# Patient Record
Sex: FEMALE | Race: WHITE | HISPANIC OR LATINO | Employment: FULL TIME
[De-identification: names, ages, dates, MRNs, and addresses within clinical notes are randomized per-mention and may not be internally consistent; named-entity substitution may affect disease eponyms.]

---

## 2017-12-24 ENCOUNTER — HEALTH MAINTENANCE LETTER (OUTPATIENT)
Age: 27
End: 2017-12-24

## 2018-11-24 ENCOUNTER — APPOINTMENT (OUTPATIENT)
Dept: GENERAL RADIOLOGY | Facility: CLINIC | Age: 28
End: 2018-11-24
Attending: NURSE PRACTITIONER
Payer: OTHER MISCELLANEOUS

## 2018-11-24 ENCOUNTER — HOSPITAL ENCOUNTER (EMERGENCY)
Facility: CLINIC | Age: 28
Discharge: HOME | End: 2018-11-24
Attending: NURSE PRACTITIONER | Admitting: NURSE PRACTITIONER
Payer: OTHER MISCELLANEOUS

## 2018-11-24 VITALS
HEIGHT: 60 IN | DIASTOLIC BLOOD PRESSURE: 72 MMHG | BODY MASS INDEX: 24.74 KG/M2 | WEIGHT: 126 LBS | HEART RATE: 71 BPM | SYSTOLIC BLOOD PRESSURE: 118 MMHG | TEMPERATURE: 97.5 F | RESPIRATION RATE: 18 BRPM | OXYGEN SATURATION: 98 %

## 2018-11-24 DIAGNOSIS — M25.532 LEFT WRIST PAIN: ICD-10-CM
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
## 2018-11-24 DIAGNOSIS — S62.002A CLOSED NONDISPLACED FRACTURE OF SCAPHOID OF LEFT WRIST, UNSPECIFIED PORTION OF SCAPHOID, INITIAL ENCOUNTER: ICD-10-CM

## 2018-11-24 PROCEDURE — 99284 EMERGENCY DEPT VISIT MOD MDM: CPT | Mod: 25

## 2018-11-24 PROCEDURE — 25000132 ZZH RX MED GY IP 250 OP 250 PS 637: Performed by: NURSE PRACTITIONER

## 2018-11-24 PROCEDURE — 25622 CLTX CARPL SCPHD FX W/O MNPJ: CPT | Mod: LT

## 2018-11-24 PROCEDURE — 73110 X-RAY EXAM OF WRIST: CPT | Mod: LT

## 2018-11-24 PROCEDURE — 73130 X-RAY EXAM OF HAND: CPT | Mod: LT

## 2018-11-24 RX ORDER — HYDROCODONE BITARTRATE AND ACETAMINOPHEN 5; 325 MG/1; MG/1
1 TABLET ORAL EVERY 6 HOURS PRN
Qty: 10 TABLET | Refills: 0 | Status: SHIPPED | OUTPATIENT
Start: 2018-11-24

## 2018-11-24 RX ORDER — HYDROCODONE BITARTRATE AND ACETAMINOPHEN 5; 325 MG/1; MG/1
2 TABLET ORAL ONCE
Status: COMPLETED | OUTPATIENT
Start: 2018-11-24 | End: 2018-11-24

## 2018-11-24 RX ORDER — HYDROCODONE BITARTRATE AND ACETAMINOPHEN 5; 325 MG/1; MG/1
1 TABLET ORAL EVERY 6 HOURS PRN
Qty: 10 TABLET | Refills: 0 | Status: SHIPPED | OUTPATIENT
Start: 2018-11-24 | End: 2018-11-24

## 2018-11-24 RX ADMIN — HYDROCODONE BITARTRATE AND ACETAMINOPHEN 2 TABLET: 5; 325 TABLET ORAL at 19:55

## 2018-11-24 ASSESSMENT — ENCOUNTER SYMPTOMS
JOINT SWELLING: 1
HEADACHES: 0
MYALGIAS: 1
ARTHRALGIAS: 1

## 2018-11-24 NOTE — ED PROVIDER NOTES
History     Chief Complaint:  Wrist Pain     ERASTO Barros is a 28 year old female who presents with left wrist pain in the setting of a mechanical fall. The patient works as a , and earlier today states that she was reaching to grab a blanket while standing on a ladder, when she fell backwards off the ladder. Upon falling the patient extended her left arm out in an attempt to break her fall and prevent injury to her head. She fell from approximately 3 steps up. She did not hit her head or sustain a loss of consciousness and she denies nausea or vomiting. Since the incident, however, she continues to have pain and swelling in her left wrist. This does not extend into her elbow or shoulder. Additionally the patient endorses lower back pain, but notes that her concern is primarily for her left wrist. She did not otherwise sustain any other injury.      Allergies:  No known drug allergies    Medications:    The patient is not currently taking any prescribed medications.    Past Medical History:    The patient does not have any past pertinent medical history.    Past Surgical History:    History reviewed. No pertinent surgical history.    Family History:    History reviewed. No pertinent family history.     Social History:  The patient was accompanied to the ED.  Smoking Status: Never  Smokeless Tobacco: Never  Alcohol Use: No     Review of Systems   Musculoskeletal: Positive for arthralgias, joint swelling and myalgias.   Neurological: Negative for syncope and headaches.   All other systems reviewed and are negative.    Physical Exam     Patient Vitals for the past 24 hrs:   BP Temp Temp src Pulse Resp SpO2 Height Weight   11/24/18 1731 123/83 97.5  F (36.4  C) Oral 71 18 98 % 1.524 m (5') 57.2 kg (126 lb)     Physical Exam  Physical Exam   Constitutional: Pt appears well-developed and well-nourished.  Head: Atraumatic. Head moves freely with normal range of motion. No battles signs. No Racoons  eyes.   ENT: Oropharynx is clear and moist. Nose with no deformity.   Eyes: Conjunctivae pink. EOMs intact. Pupils are equal, round, and reactive to light.  Neck: Normal range of motion. No midline C Spine tenderness, step-off or crepitus.   Cardiovascular: Regular rate and rhythm. Normal heart sounds. Intact distal pulses: radial pulses 2+ on the right, 2+ on the left.   Pulmonary/Chest: No respiratory distress.  Breath sounds normal.   Abdominal: Soft. Non-tender. No rebound, no guarding.   Musculoskeletal: Left wrist with mild edema, no erythema or heat to touch. Increased pain with flexion and extension of the wrist. Pain over the anatomic snuff box and increased pain with axial loading of the thumb. Thumb and 1st MC are non tender to palpation. No increased pain with stress of the ulnar collateral ligament of the thumb. Normal coloration and sensation of the wrist and hand. Distal capillary refill intact. Right low back with diffuse muscle tenderness. No lumbar bony spinal pain.   Neurological: Oriented to person, place, and time. No focal deficits.   Skin: Skin is warm.         Emergency Department Course     Imaging:  Radiology findings were communicated with the patient who voiced understanding of the findings.  XR Wrist Left G/E 3 Views:  IMPRESSION: No acute osseous abnormality demonstrated.    Per radiology    Interventions:  1955 - Norco 325 mg per tablet, 2 tablets PO     Emergency Department Course:  Nursing notes and vitals reviewed.    The patient was sent for x-ray imaging while in the emergency department, results above.     1857: I performed an exam of the patient as documented above.     Findings and plan explained to the Patient. Patient discharged home with instructions regarding supportive care, medications, and reasons to return. The importance of close follow-up was reviewed.     Impression & Plan      Medical Decision Making:  Argentina Barros is a 28 year old female presents for  evaluation of left wrist pain. Exam is concerning for scaphoid fracture and this fits with the mechanism of injury. Xray is negative and we discussed that this is an occult fracture and we often do not see this on initial imaging. No pain to the hand or forearm on exam. Thumb spica splint placed and follow up with Orthopedics was advised in 5 days. We discussed reasons to return here. Remainder of exam with no concerns for further injuries. She and her family are amenable to plan.       Diagnosis:    ICD-10-CM    1. Left wrist pain M25.532    2. Fall from ladder, initial encounter W11.XXXA    3. Closed nondisplaced fracture of scaphoid of left wrist, unspecified portion of scaphoid, initial encounter S62.002A        Disposition:  discharged to home    Discharge Medications:  New Prescriptions    HYDROCODONE-ACETAMINOPHEN (NORCO) 5-325 MG TABLET    Take 1 tablet by mouth every 6 hours as needed for severe pain         Marie Acosta  11/24/2018    EMERGENCY DEPARTMENT  I, Marie Acosta am serving as a scribe at 6:57 PM on 11/24/2018 to document services personally performed by Ayala Cortez based on my observations and the provider's statements to me.       Ayala Cortez, APRN CNP  11/24/18 2403

## 2018-11-24 NOTE — LETTER
November 24, 2018      To Whom It May Concern:      Salud Barros was seen in our Emergency Department today, 11/24/18.    She will not bel able to use her left arm/hand until seen by Orthopedics at the end of this week. They will provide further guidance for restriction at that time.         Sincerely,        JINNY Cardoso CNP

## 2018-11-24 NOTE — ED AVS SNAPSHOT
Emergency Department    [address] [facility] [address]    Phone:  [phone]    Fax:  [phone]                                       Salud Barros   MRN: [id number]    Department:   Emergency Department   Date of Visit:  11/24/2018           After Visit Summary Signature Page     I have received my discharge instructions, and my questions have been answered. I have discussed any challenges I see with this plan with the nurse or doctor.    ..........................................................................................................................................  Patient/Patient Representative Signature      ..........................................................................................................................................  Patient Representative Print Name and Relationship to Patient    ..................................................               ................................................  Date                                   Time    ..........................................................................................................................................  Reviewed by Signature/Title    ...................................................              ..............................................  Date                                               Time          22EPIC Rev 08/18

## 2018-11-24 NOTE — LETTER
November 24, 2018      To Whom It May Concern:      Sanju Castro was seen in our Emergency Department today, 11/24/18.      She will not be able to use her left hand/arm until she is seen by Orthopedics.           Sincerely,        JINNY Cardoso CNP

## 2018-11-24 NOTE — ED AVS SNAPSHOT
Emergency Department    [address] [facility] [address]    Phone:  [phone]    Fax:  [phone]                                       Salud Barros   MRN: [id number]    Department:   Emergency Department   Date of Visit:  11/24/2018           Patient Information     Date Of Birth          1990        Your diagnoses for this visit were:     Left wrist pain     Fall from ladder, initial encounter     Closed nondisplaced fracture of scaphoid of left wrist, unspecified portion of scaphoid, initial encounter        You were seen by Ayala Cortez APRN CNP.      Follow-up Information     Follow up with [facility] In 5 days.    Contact information:    [address] [address] [phone]        Discharge Instructions         Possible Wrist Fracture  You are very sore over a bone in your wrist called the navicular, or scaphoid, bone. This could be a sign of a hairline fracture, or break. But no fracture was seen on the X-ray. So a splint or cast will be applied until repeat X-rays are taken in about 1 to 2 weeks or other tests are done. If you have a hairline fracture, it will often show up on the second X-ray. Then you will have to keep wearing a cast for about 6 to 20 weeks, depending on the location of the fracture. Sometimes other tests such as an MRI are needed. If no fracture is seen on the second X-ray or other tests, this means you may only have a wrist sprain. The splint or cast can then often be removed.     Home care    Keep your arm raised to reduce pain and swelling. When sitting or lying down, raise your arm above the level of your heart. You can do this by placing your arm on a pillow that rests on your chest or on a pillow at your side. This is most important during the first 48 hours after injury.    Place an ice pack over the injured area for no more than 15 to 20 minutes. Do this every 1 to 2 hours for the  first 24 to 48 hours. To make an ice pack, put ice cubes in a plastic bag that seals at the top. Wrap the bag in a clean, thin towel or cloth. Never put ice or an ice pack directly on the skin. As the ice melts, be careful that the cast or splint doesn t get wet. You can place the ice pack inside the sling and directly over the splint or cast. Keep using ice packs as needed to ease pain and swelling.    Keep the cast or splint completely dry at all times. Bathe with your cast or splint out of the water. Protect it with 2 large plastic bags. Place 1 bag around the other. Tape each bag with duct tape at the top end or use rubber bands. If a fiberglass cast or splint gets wet, you can dry it with a hair dryer on a cool setting.    You may use over-the-counter pain medicine to control pain, unless another pain medicine was prescribed. If you have chronic liver or kidney disease or ever had a stomach ulcer or GI (gastrointestinal) bleeding, talk with your provider before using these medicines.    If you smoke, try to quit. Tobacco use can interfere with the healing of this fracture. It can also increase the risk of a complication needing surgery.  Follow-up care  Follow up with your healthcare provider in 1 week, or as advised. This is to be sure the bone is healing properly.  If X-rays were taken, you will be told of any new findings that may affect your care.  When to seek medical advice  Call your healthcare provider right away if any of the following occur:    The plaster cast or splint becomes wet or soft    The plaster cast or splint becomes loose    The fiberglass cast or splint remains wet for more than 24 hours    Increased tightness or pain occurs under the cast or splint    Fingers become swollen, cold, blue, numb, or tingly  Date Last Reviewed: 5/1/2018 2000-2018 [employer]. [address] [place] [address], [place] [address]. All rights reserved. This information is not intended as a substitute for  professional medical care. Always follow your healthcare professional's instructions.          24 Hour Appointment Hotline       To make an appointment at any [facility], call [phone] ([phone]). If you don't have a family doctor or clinic, we will help you find one. [facility] are conveniently located to serve the needs of you and your family.             Review of your medicines      START taking        Dose / Directions Last dose taken    HYDROcodone-acetaminophen 5-325 MG tablet   Commonly known as:  NORCO   Dose:  1 tablet   Quantity:  10 tablet        Take 1 tablet by mouth every 6 hours as needed for severe pain   Refills:  0                Information about OPIOIDS     PRESCRIPTION OPIOIDS: WHAT YOU NEED TO KNOW   We gave you an opioid (narcotic) pain medicine. It is important to manage your pain, but opioids are not always the best choice. You should first try all the other options your care team gave you. Take this medicine for as short a time (and as few doses) as possible.    Some activities can increase your pain, such as bandage changes or therapy sessions. It may help to take your pain medicine 30 to 60 minutes before these activities. Reduce your stress by getting enough sleep, working on hobbies you enjoy and practicing relaxation or meditation. Talk to your care team about ways to manage your pain beyond prescription opioids.    These medicines have risks:    DO NOT drive when on new or higher doses of pain medicine. These medicines can affect your alertness and reaction times, and you could be arrested for driving under the influence (DUI). If you need to use opioids long-term, talk to your care team about driving.    DO NOT operate heavy machinery    DO NOT do any other dangerous activities while taking these medicines.    DO NOT drink any alcohol while taking these medicines.     If the opioid prescribed includes acetaminophen, DO NOT take with any other medicines that  contain acetaminophen. Read all labels carefully. Look for the word  acetaminophen  or  Tylenol.  Ask your pharmacist if you have questions or are unsure.    You can get addicted to pain medicines, especially if you have a history of addiction (chemical, alcohol or substance dependence). Talk to your care team about ways to reduce this risk.    All opioids tend to cause constipation. Drink plenty of water and eat foods that have a lot of fiber, such as fruits, vegetables, prune juice, apple juice and high-fiber cereal. Take a laxative (Miralax, milk of magnesia, Colace, Senna) if you don t move your bowels at least every other day. Other side effects include upset stomach, sleepiness, dizziness, throwing up, tolerance (needing more of the medicine to have the same effect), physical dependence and slowed breathing.    Store your pills in a secure place, locked if possible. We will not replace any lost or stolen medicine. If you don t finish your medicine, please throw away (dispose) as directed by your pharmacist. The [place] [employer] has more information about safe disposal: https://www.pca.state.[place]/living-green/managing-unwanted-medications        Prescriptions were sent or printed at these locations (1 Prescription)                   Other Prescriptions                Printed at Department/Unit printer (1 of 1)         HYDROcodone-acetaminophen (NORCO) 5-325 MG tablet                Procedures and tests performed during your visit     XR Hand Left G/E 3 Views    XR Wrist Left G/E 3 Views      Orders Needing Specimen Collection     None      Pending Results     Date and Time Order Name Status Description    11/24/2018 1814 XR Wrist Left G/E 3 Views Preliminary     11/24/2018 1814 XR Hand Left G/E 3 Views Preliminary             Pending Culture Results     No orders found from 11/22/2018 to 11/25/2018.            Pending Results Instructions     If you had any lab results that were not finalized  at the time of your Discharge, you can call the ED Lab Result RN at [phone]. You will be contacted by this team for any positive Lab results or changes in treatment. The nurses are available 7 days a week from 10A to 6:30P.  You can leave a message 24 hours per day and they will return your call.        Test Results From Your Hospital Stay        11/24/2018  6:39 PM      Narrative     HAND THREE VIEWS LEFT  11/24/2018 6:25 PM     HISTORY: Fall.     COMPARISON: None.    FINDINGS: There is no significant degenerative change. There is no  acute fracture or dislocation. There are no worrisome bony lesions.        Impression     IMPRESSION: No acute osseous abnormality demonstrated.         11/24/2018  6:39 PM      Narrative     WRIST THREE VIEWS LEFT 11/24/2018 6:26 PM     HISTORY: Fall.     COMPARISON: None.    FINDINGS: There is no significant degenerative change. The  scapholunate interval appears within normal limits. There is no acute  fracture.  No dislocation.There are no worrisome bony lesions.        Impression     IMPRESSION: No acute osseous abnormality demonstrated.                Clinical Quality Measure: Blood Pressure Screening     Your blood pressure was checked while you were in the emergency department today. The last reading we obtained was  BP: 123/83 . Please read the guidelines below about what these numbers mean and what you should do about them.  If your systolic blood pressure (the top number) is less than 120 and your diastolic blood pressure (the bottom number) is less than 80, then your blood pressure is normal. There is nothing more that you need to do about it.  If your systolic blood pressure (the top number) is 120-139 or your diastolic blood pressure (the bottom number) is 80-89, your blood pressure may be higher than it should be. You should have your blood pressure rechecked within a year by a primary care provider.  If your systolic blood pressure (the top number) is 140 or  "greater or your diastolic blood pressure (the bottom number) is 90 or greater, you may have high blood pressure. High blood pressure is treatable, but if left untreated over time it can put you at risk for heart attack, stroke, or kidney failure. You should have your blood pressure rechecked by a primary care provider within the next 4 weeks.  If your provider in the emergency department today gave you specific instructions to follow-up with your doctor or provider even sooner than that, you should follow that instruction and not wait for up to 4 weeks for your follow-up visit.        Thank you for choosing [facility]       Thank you for choosing [facility] for your care. Our goal is always to provide you with excellent care. Hearing back from our patients is one way we can continue to improve our services. Please take a few minutes to complete the written survey that you may receive in the mail after you visit with us. Thank you!        [employer] Information     [employer] lets you send messages to your doctor, view your test results, renew your prescriptions, schedule appointments and more. To sign up, go to [email or web address] . Click on \"Log in\" on the left side of the screen, which will take you to the Welcome page. Then click on \"Sign up Now\" on the right side of the page.     You will be asked to enter the access code listed below, as well as some personal information. Please follow the directions to create your username and password.     Your access code is: [email or web address]  Expires: 2019  7:38 PM     Your access code will  in 90 days. If you need help or a new code, please call your [facility] clinic or [phone].        Care EveryWhere ID     This is your Care EveryWhere ID. This could be used by other organizations to access your [facility] medical records  [id number]        Equal Access to Services     JEREMY MACIEL AH: avinash Staton qaybta kaalmada adeegyada, waxay " tashi allred ah. So [facility] [phone].    ATENCIÓN: Si habla español, tiene a tsang disposición servicios gratuitos de asistencia lingüística. Llame al [phone].    We comply with applicable federal civil rights laws and [place] laws. We do not discriminate on the basis of race, color, national origin, age, disability, sex, sexual orientation, or gender identity.            After Visit Summary       This is your record. Keep this with you and show to your community pharmacist(s) and doctor(s) at your next visit.

## 2018-11-25 NOTE — DISCHARGE INSTRUCTIONS
Possible Wrist Fracture  You are very sore over a bone in your wrist called the navicular, or scaphoid, bone. This could be a sign of a hairline fracture, or break. But no fracture was seen on the X-ray. So a splint or cast will be applied until repeat X-rays are taken in about 1 to 2 weeks or other tests are done. If you have a hairline fracture, it will often show up on the second X-ray. Then you will have to keep wearing a cast for about 6 to 20 weeks, depending on the location of the fracture. Sometimes other tests such as an MRI are needed. If no fracture is seen on the second X-ray or other tests, this means you may only have a wrist sprain. The splint or cast can then often be removed.     Home care    Keep your arm raised to reduce pain and swelling. When sitting or lying down, raise your arm above the level of your heart. You can do this by placing your arm on a pillow that rests on your chest or on a pillow at your side. This is most important during the first 48 hours after injury.    Place an ice pack over the injured area for no more than 15 to 20 minutes. Do this every 1 to 2 hours for the first 24 to 48 hours. To make an ice pack, put ice cubes in a plastic bag that seals at the top. Wrap the bag in a clean, thin towel or cloth. Never put ice or an ice pack directly on the skin. As the ice melts, be careful that the cast or splint doesn t get wet. You can place the ice pack inside the sling and directly over the splint or cast. Keep using ice packs as needed to ease pain and swelling.    Keep the cast or splint completely dry at all times. Bathe with your cast or splint out of the water. Protect it with 2 large plastic bags. Place 1 bag around the other. Tape each bag with duct tape at the top end or use rubber bands. If a fiberglass cast or splint gets wet, you can dry it with a hair dryer on a cool setting.    You may use over-the-counter pain medicine to control pain, unless another pain medicine  was prescribed. If you have chronic liver or kidney disease or ever had a stomach ulcer or GI (gastrointestinal) bleeding, talk with your provider before using these medicines.    If you smoke, try to quit. Tobacco use can interfere with the healing of this fracture. It can also increase the risk of a complication needing surgery.  Follow-up care  Follow up with your healthcare provider in 1 week, or as advised. This is to be sure the bone is healing properly.  If X-rays were taken, you will be told of any new findings that may affect your care.  When to seek medical advice  Call your healthcare provider right away if any of the following occur:    The plaster cast or splint becomes wet or soft    The plaster cast or splint becomes loose    The fiberglass cast or splint remains wet for more than 24 hours    Increased tightness or pain occurs under the cast or splint    Fingers become swollen, cold, blue, numb, or tingly  Date Last Reviewed: 5/1/2018 2000-2018 [employer]. [address] [place] [address], [place] [id number]. All rights reserved. This information is not intended as a substitute for professional medical care. Always follow your healthcare professional's instructions.

## 2019-04-15 ENCOUNTER — APPOINTMENT (OUTPATIENT)
Dept: CT IMAGING | Facility: CLINIC | Age: 29
End: 2019-04-15
Attending: EMERGENCY MEDICINE
Payer: MEDICAID

## 2019-04-15 ENCOUNTER — HOSPITAL ENCOUNTER (EMERGENCY)
Facility: CLINIC | Age: 29
Discharge: HOME | End: 2019-04-15
Attending: EMERGENCY MEDICINE | Admitting: EMERGENCY MEDICINE
Payer: MEDICAID

## 2019-04-15 VITALS
HEIGHT: 62 IN | RESPIRATION RATE: 16 BRPM | TEMPERATURE: 98.3 F | BODY MASS INDEX: 24.48 KG/M2 | SYSTOLIC BLOOD PRESSURE: 118 MMHG | HEART RATE: 81 BPM | DIASTOLIC BLOOD PRESSURE: 78 MMHG | OXYGEN SATURATION: 97 % | WEIGHT: 133 LBS

## 2019-04-15 DIAGNOSIS — R10.10 PAIN OF UPPER ABDOMEN: ICD-10-CM

## 2019-04-15 LAB
ALBUMIN SERPL-MCNC: 3.8 G/DL (ref 3.4–5)
ALBUMIN UR-MCNC: NEGATIVE MG/DL
ALP SERPL-CCNC: 92 U/L (ref 40–150)
ALT SERPL W P-5'-P-CCNC: 23 U/L (ref 0–50)
ANION GAP SERPL CALCULATED.3IONS-SCNC: 8 MMOL/L (ref 3–14)
APPEARANCE UR: CLEAR
AST SERPL W P-5'-P-CCNC: 18 U/L (ref 0–45)
BACTERIA #/AREA URNS HPF: ABNORMAL /HPF
BASOPHILS # BLD AUTO: 0 10E9/L (ref 0–0.2)
BASOPHILS NFR BLD AUTO: 0.4 %
BILIRUB SERPL-MCNC: 0.6 MG/DL (ref 0.2–1.3)
BILIRUB UR QL STRIP: NEGATIVE
BUN SERPL-MCNC: 9 MG/DL (ref 7–30)
CALCIUM SERPL-MCNC: 8.8 MG/DL (ref 8.5–10.1)
CHLORIDE SERPL-SCNC: 107 MMOL/L (ref 94–109)
CO2 SERPL-SCNC: 24 MMOL/L (ref 20–32)
COLOR UR AUTO: YELLOW
CREAT SERPL-MCNC: 0.5 MG/DL (ref 0.52–1.04)
DIFFERENTIAL METHOD BLD: NORMAL
EOSINOPHIL # BLD AUTO: 0.2 10E9/L (ref 0–0.7)
EOSINOPHIL NFR BLD AUTO: 2.2 %
ERYTHROCYTE [DISTWIDTH] IN BLOOD BY AUTOMATED COUNT: 12.1 % (ref 10–15)
GFR SERPL CREATININE-BSD FRML MDRD: >90 ML/MIN/{1.73_M2}
GLUCOSE SERPL-MCNC: 115 MG/DL (ref 70–99)
GLUCOSE UR STRIP-MCNC: NEGATIVE MG/DL
HCG UR QL: NEGATIVE
HCT VFR BLD AUTO: 36 % (ref 35–47)
HGB BLD-MCNC: 12.5 G/DL (ref 11.7–15.7)
HGB UR QL STRIP: NEGATIVE
IMM GRANULOCYTES # BLD: 0 10E9/L (ref 0–0.4)
IMM GRANULOCYTES NFR BLD: 0.3 %
KETONES UR STRIP-MCNC: NEGATIVE MG/DL
LEUKOCYTE ESTERASE UR QL STRIP: NEGATIVE
LIPASE SERPL-CCNC: 136 U/L (ref 73–393)
LYMPHOCYTES # BLD AUTO: 2.2 10E9/L (ref 0.8–5.3)
LYMPHOCYTES NFR BLD AUTO: 31.3 %
MCH RBC QN AUTO: 29.4 PG (ref 26.5–33)
MCHC RBC AUTO-ENTMCNC: 34.7 G/DL (ref 31.5–36.5)
MCV RBC AUTO: 85 FL (ref 78–100)
MONOCYTES # BLD AUTO: 0.4 10E9/L (ref 0–1.3)
MONOCYTES NFR BLD AUTO: 5.5 %
MUCOUS THREADS #/AREA URNS LPF: PRESENT /LPF
NEUTROPHILS # BLD AUTO: 4.2 10E9/L (ref 1.6–8.3)
NEUTROPHILS NFR BLD AUTO: 60.3 %
NITRATE UR QL: NEGATIVE
NRBC # BLD AUTO: 0 10*3/UL
NRBC BLD AUTO-RTO: 0 /100
PH UR STRIP: 6.5 PH (ref 5–7)
PLATELET # BLD AUTO: 282 10E9/L (ref 150–450)
POTASSIUM SERPL-SCNC: 3.3 MMOL/L (ref 3.4–5.3)
PROT SERPL-MCNC: 7.5 G/DL (ref 6.8–8.8)
RBC # BLD AUTO: 4.25 10E12/L (ref 3.8–5.2)
RBC #/AREA URNS AUTO: 1 /HPF (ref 0–2)
SODIUM SERPL-SCNC: 139 MMOL/L (ref 133–144)
SOURCE: ABNORMAL
SP GR UR STRIP: 1.01 (ref 1–1.03)
SQUAMOUS #/AREA URNS AUTO: 1 /HPF (ref 0–1)
UROBILINOGEN UR STRIP-MCNC: NORMAL MG/DL (ref 0–2)
WBC # BLD AUTO: 6.9 10E9/L (ref 4–11)
WBC #/AREA URNS AUTO: 1 /HPF (ref 0–5)

## 2019-04-15 PROCEDURE — 96375 TX/PRO/DX INJ NEW DRUG ADDON: CPT

## 2019-04-15 PROCEDURE — 96374 THER/PROPH/DIAG INJ IV PUSH: CPT | Mod: 59

## 2019-04-15 PROCEDURE — 81001 URINALYSIS AUTO W/SCOPE: CPT | Performed by: EMERGENCY MEDICINE

## 2019-04-15 PROCEDURE — 25000132 ZZH RX MED GY IP 250 OP 250 PS 637: Performed by: EMERGENCY MEDICINE

## 2019-04-15 PROCEDURE — 74177 CT ABD & PELVIS W/CONTRAST: CPT

## 2019-04-15 PROCEDURE — 96361 HYDRATE IV INFUSION ADD-ON: CPT

## 2019-04-15 PROCEDURE — 85025 COMPLETE CBC W/AUTO DIFF WBC: CPT | Performed by: EMERGENCY MEDICINE

## 2019-04-15 PROCEDURE — 25000125 ZZHC RX 250: Performed by: EMERGENCY MEDICINE

## 2019-04-15 PROCEDURE — 80053 COMPREHEN METABOLIC PANEL: CPT | Performed by: EMERGENCY MEDICINE

## 2019-04-15 PROCEDURE — 99285 EMERGENCY DEPT VISIT HI MDM: CPT | Mod: 25

## 2019-04-15 PROCEDURE — 25000128 H RX IP 250 OP 636: Performed by: EMERGENCY MEDICINE

## 2019-04-15 PROCEDURE — 83690 ASSAY OF LIPASE: CPT | Performed by: EMERGENCY MEDICINE

## 2019-04-15 PROCEDURE — 81025 URINE PREGNANCY TEST: CPT | Performed by: EMERGENCY MEDICINE

## 2019-04-15 RX ORDER — IOPAMIDOL 755 MG/ML
66 INJECTION, SOLUTION INTRAVASCULAR ONCE
Status: COMPLETED | OUTPATIENT
Start: 2019-04-15 | End: 2019-04-15

## 2019-04-15 RX ORDER — KETOROLAC TROMETHAMINE 15 MG/ML
15 INJECTION, SOLUTION INTRAMUSCULAR; INTRAVENOUS ONCE
Status: COMPLETED | OUTPATIENT
Start: 2019-04-15 | End: 2019-04-15

## 2019-04-15 RX ORDER — SODIUM CHLORIDE 9 MG/ML
1000 INJECTION, SOLUTION INTRAVENOUS CONTINUOUS
Status: DISCONTINUED | OUTPATIENT
Start: 2019-04-15 | End: 2019-04-15 | Stop reason: HOSPADM

## 2019-04-15 RX ORDER — SUCRALFATE 1 G/1
1 TABLET ORAL 4 TIMES DAILY
Qty: 28 TABLET | Refills: 0 | Status: SHIPPED | OUTPATIENT
Start: 2019-04-15 | End: 2019-04-22

## 2019-04-15 RX ADMIN — LIDOCAINE HYDROCHLORIDE 30 ML: 20 SOLUTION ORAL; TOPICAL at 13:55

## 2019-04-15 RX ADMIN — IOPAMIDOL 66 ML: 755 INJECTION, SOLUTION INTRAVENOUS at 16:01

## 2019-04-15 RX ADMIN — SODIUM CHLORIDE 1000 ML: 9 INJECTION, SOLUTION INTRAVENOUS at 13:33

## 2019-04-15 RX ADMIN — KETOROLAC TROMETHAMINE 15 MG: 15 INJECTION, SOLUTION INTRAMUSCULAR; INTRAVENOUS at 13:56

## 2019-04-15 RX ADMIN — SODIUM CHLORIDE 60 ML: 9 INJECTION, SOLUTION INTRAVENOUS at 16:01

## 2019-04-15 RX ADMIN — FAMOTIDINE 20 MG: 10 INJECTION, SOLUTION INTRAVENOUS at 15:45

## 2019-04-15 ASSESSMENT — ENCOUNTER SYMPTOMS
HEADACHES: 1
PALPITATIONS: 1
ABDOMINAL PAIN: 1

## 2019-04-15 ASSESSMENT — MIFFLIN-ST. JEOR: SCORE: 1291.53

## 2019-04-15 NOTE — ED PROVIDER NOTES
"  History     Chief Complaint:  Abdominal Pain       The history is limited by a language barrier. A  was used (Romansh).      Salud Argueta is a 28 year old female with a history of cholecystectomy and anxiety who presents to the emergency department with her sister for evaluation of abdominal pain. The patient reports that yesterday when she got home from work she laid down, and when she got up she had the onset of right upper abdominal pain that has been constant since and progressively worsening. She states that the pain feels as if \"something is poking her\" and feels very deep. The pain improves when she lays down, and worsens when she walks, twists, or takes deep breaths. It occasionally radiates over near her umbilicus and up into her chest. Additionally, her abdominal pain was followed by palpitations and a headache. She also has left sided jaw pain when she opens and closes her mouth. One week ago, she was vomiting, but did not have pain at this time. She has tried ibuprofen with no improvement. Her last menstrual period was 3/28/19 and she is not concerned for pregnancy. She has never had pain like this before. The patient denies recent long travel, leg swelling, surgeries in the last month or two, or use of birth control or hormones.     Allergies:  No Known Drug Allergies    Medications:    Univert  Zoloft  Flexeril     Past Medical History:    High risk HPV screening  Cholelithiasis   Vitamin D deficiency   Anxiety   Muscle spasms     Past Surgical History:    Cholecystectomy     Family History:    Diabetes     Social History:  Tobacco Use: Never  Alcohol Use: No  PCP: Physician No Ref-Primary  Marital Status:       Review of Systems   Cardiovascular: Positive for palpitations. Negative for leg swelling.   Gastrointestinal: Positive for abdominal pain.   Neurological: Positive for headaches.   All other systems reviewed and are negative.      Physical Exam " "    Patient Vitals for the past 24 hrs:   BP Temp Temp src Pulse Heart Rate Resp SpO2 Height Weight   04/15/19 1646 116/82 -- -- 78 -- -- 98 % -- --   04/15/19 1550 -- -- -- -- -- -- 98 % -- --   04/15/19 1530 -- -- -- -- -- -- 97 % -- --   04/15/19 1450 118/75 -- -- 74 -- -- -- -- --   04/15/19 1248 121/70 98.3  F (36.8  C) Oral -- 84 16 98 % 1.575 m (5' 2\") 60.3 kg (133 lb)       Physical Exam  General: Uncomfortable appearing young woman holding her right upper quadrant and moaning.    Eye:  Pupils are equal, round, and reactive.  Extraocular movements intact.    ENT:  No rhinorrhea.  Moist mucus membranes.  Normal tongue and tonsil.    Cardiac:  Regular rate and rhythm.  No murmurs, gallops, or rubs.    Pulmonary:  Clear to auscultation bilaterally.  No wheezes, rales, or rhonchi.    Abdomen:  Positive bowel sounds.  There is focal tenderness in the epigastrium and right upper quadrant without rebound or guarding.    Musculoskeletal:  Normal movement of all extremities without evidence for deficit.    Skin:  Warm and dry without rashes.    Neurologic:  Non-focal exam without asymmetric weakness or numbness.     Psychiatric:  Normal affect with appropriate interaction with examiner.    Emergency Department Course   Imaging:  Abdomen/Pelvis CT with IV contrast:  IMPRESSION:   1. No cause of acute pain identified in the abdomen or pelvis. The  appendix is unremarkable.  2. A 2 cm soft tissue attenuation focus in the region of the  endometrium near the uterine fundus could represent a subcostal  fibroid or polyp. Recommend comparison with prior imaging studies, if  available. If not available and desired, an ultrasound of the pelvis  could be considered for further evaluation.  Report per radiology.     Radiographic findings were communicated with the patient who voiced understanding of the findings.    Laboratory:  CBC: WBC: 6.9, HGB: 12.5, PLT: 282  CMP: Glucose 115 (H), Potassium: 3.3 (L), Creatinine: 0.50 (L), " o/w WNL     UA: Clear yellow urine, bacteria: few, mucous: present, otherwise WNL    HCG qualitative: Negative    Lipase: 136    Interventions:  1333 0.9% Sodium Chloride BOLUS 1500 mLs IV   1355 Xylocaine and Mylanta 30 mLs PO  1356 Toradol 15 mg IV  1545 Pepcid 20 mg IV    Emergency Department Course:  1306 Nursing notes and vitals reviewed. I performed an exam of the patient as documented above.     IV inserted. Medicine administered as documented above. Blood drawn. This was sent to the lab for further testing, results above.    The patient provided a urine sample here in the emergency department. This was sent for laboratory testing, findings above.     1518 I rechecked the patient and discussed the results of her workup thus far.     The patient was sent for an abdomen/pelvis CT while in the emergency department, findings above.     1652 I rechecked the patient and discussed the results of her workup thus far.     Findings and plan explained to the Patient. Patient discharged home with instructions regarding supportive care, medications, and reasons to return. The importance of close follow-up was reviewed. The patient was prescribed Prilosec, Zantac, and Carafate.     I personally reviewed the laboratory results with the Patient and answered all related questions prior to discharge.     Impression & Plan    Medical Decision Making:  This delightful young woman presents was with complaints of having epigastric and right upper quadrant pain for the last 2 days.  She is status post cholecystectomy.  She describes her being a mild pleuritic component that was not acutely short of breath.  She is PERC negative and I do not believe she requires further workup for a PE.  She has no tenderness over the ribs and is suffered no trauma.  There have limited concern that this represents a pulmonary issue.  Laboratory investigation was pursued which is all unremarkable for pancreatitis, hepatitis, or other signs of serious  intra-abdominal catastrophe.  She was given a GI cocktail with significant improvement in her pain, though when I went back to reassess her, her pain had returned and this resulted in a CT which fortunately shows no evidence of significant pathology from her prior surgery in the right upper quadrant.  I also gave her a dose of famotidine at that time with significant improvement in her pain.  With this, I believe that symptoms are most likely secondary to gastritis or even peptic ulcer disease.  Plan will be for a trial of Prilosec, Zantac, and Carafate with close outpatient follow-up.  She was invited back to our facility at any point for worsening of her condition or other emergent concerns.    Diagnosis:    ICD-10-CM    1. Pain of upper abdomen R10.10        Disposition:  Discharged to home    Discharge Medications:     Medication List      Started    omeprazole 20 MG DR capsule  Commonly known as:  priLOSEC  20 mg, Oral, DAILY     ranitidine 150 MG tablet  Commonly known as:  ZANTAC  150 mg, Oral, 3 TIMES DAILY BEFORE MEALS     sucralfate 1 GM tablet  Commonly known as:  CARAFATE  1 g, Oral, 4 TIMES DAILY          Scribe Disclosure:  I, Brien Disla, am serving as a scribe on 4/15/2019 at 1:06 PM to personally document services performed by Dr. Trierweiler, MD based on my observations and the provider's statements to me.     Brien Disla  4/15/2019    EMERGENCY DEPARTMENT       Trierweiler, Chad A, MD  04/15/19 1012

## 2019-04-15 NOTE — ED AVS SNAPSHOT
Emergency Department  [address] [place] [address]  Phone:  [phone]  Fax:  [phone]                                    Salud Argueta   MRN: [id number]    Department:   Emergency Department   Date of Visit:  4/15/2019           After Visit Summary Signature Page    I have received my discharge instructions, and my questions have been answered. I have discussed any challenges I see with this plan with the nurse or doctor.    ..........................................................................................................................................  Patient/Patient Representative Signature      ..........................................................................................................................................  Patient Representative Print Name and Relationship to Patient    ..................................................               ................................................  Date                                   Time    ..........................................................................................................................................  Reviewed by Signature/Title    ...................................................              ..............................................  Date                                               Time          22EPIC Rev 08/18

## 2019-04-15 NOTE — ED NOTES
"RN at bedside, pt reports the GI cocktail/toradol helped with the pain but it is still present and she states it is \"pulsating.\" MD updated  "

## 2024-08-09 ENCOUNTER — APPOINTMENT (OUTPATIENT)
Dept: ULTRASOUND IMAGING | Facility: CLINIC | Age: 34
End: 2024-08-09
Attending: EMERGENCY MEDICINE

## 2024-08-09 ENCOUNTER — HOSPITAL ENCOUNTER (EMERGENCY)
Facility: CLINIC | Age: 34
Discharge: HOME | End: 2024-08-10
Attending: EMERGENCY MEDICINE | Admitting: EMERGENCY MEDICINE

## 2024-08-09 DIAGNOSIS — O20.9 VAGINAL BLEEDING IN PREGNANCY, FIRST TRIMESTER: ICD-10-CM

## 2024-08-09 LAB
ANION GAP SERPL CALCULATED.3IONS-SCNC: 11 MMOL/L (ref 7–15)
BASOPHILS # BLD AUTO: 0 10E3/UL (ref 0–0.2)
BASOPHILS NFR BLD AUTO: 0 %
BUN SERPL-MCNC: 7.4 MG/DL (ref 6–20)
CALCIUM SERPL-MCNC: 9 MG/DL (ref 8.8–10.4)
CHLORIDE SERPL-SCNC: 101 MMOL/L (ref 98–107)
CREAT SERPL-MCNC: 0.42 MG/DL (ref 0.51–0.95)
EGFRCR SERPLBLD CKD-EPI 2021: >90 ML/MIN/1.73M2
EOSINOPHIL # BLD AUTO: 0.1 10E3/UL (ref 0–0.7)
EOSINOPHIL NFR BLD AUTO: 2 %
ERYTHROCYTE [DISTWIDTH] IN BLOOD BY AUTOMATED COUNT: 20.8 % (ref 10–15)
GLUCOSE SERPL-MCNC: 89 MG/DL (ref 70–99)
HCG INTACT+B SERPL-ACNC: ABNORMAL MIU/ML
HCO3 SERPL-SCNC: 23 MMOL/L (ref 22–29)
HCT VFR BLD AUTO: 37.3 % (ref 35–47)
HGB BLD-MCNC: 12.5 G/DL (ref 11.7–15.7)
IMM GRANULOCYTES # BLD: 0 10E3/UL
IMM GRANULOCYTES NFR BLD: 0 %
LYMPHOCYTES # BLD AUTO: 2 10E3/UL (ref 0.8–5.3)
LYMPHOCYTES NFR BLD AUTO: 27 %
MCH RBC QN AUTO: 26.3 PG (ref 26.5–33)
MCHC RBC AUTO-ENTMCNC: 33.5 G/DL (ref 31.5–36.5)
MCV RBC AUTO: 79 FL (ref 78–100)
MONOCYTES # BLD AUTO: 0.5 10E3/UL (ref 0–1.3)
MONOCYTES NFR BLD AUTO: 7 %
NEUTROPHILS # BLD AUTO: 4.9 10E3/UL (ref 1.6–8.3)
NEUTROPHILS NFR BLD AUTO: 64 %
NRBC # BLD AUTO: 0 10E3/UL
NRBC BLD AUTO-RTO: 0 /100
PLATELET # BLD AUTO: 260 10E3/UL (ref 150–450)
POTASSIUM SERPL-SCNC: 3.9 MMOL/L (ref 3.4–5.3)
RBC # BLD AUTO: 4.75 10E6/UL (ref 3.8–5.2)
SODIUM SERPL-SCNC: 135 MMOL/L (ref 135–145)
WBC # BLD AUTO: 7.6 10E3/UL (ref 4–11)

## 2024-08-09 PROCEDURE — 36415 COLL VENOUS BLD VENIPUNCTURE: CPT | Performed by: EMERGENCY MEDICINE

## 2024-08-09 PROCEDURE — 99285 EMERGENCY DEPT VISIT HI MDM: CPT | Mod: 25

## 2024-08-09 PROCEDURE — 80048 BASIC METABOLIC PNL TOTAL CA: CPT | Performed by: EMERGENCY MEDICINE

## 2024-08-09 PROCEDURE — 76801 OB US < 14 WKS SINGLE FETUS: CPT

## 2024-08-09 PROCEDURE — 85004 AUTOMATED DIFF WBC COUNT: CPT | Performed by: EMERGENCY MEDICINE

## 2024-08-09 PROCEDURE — 85025 COMPLETE CBC W/AUTO DIFF WBC: CPT | Performed by: EMERGENCY MEDICINE

## 2024-08-09 PROCEDURE — 84702 CHORIONIC GONADOTROPIN TEST: CPT | Performed by: EMERGENCY MEDICINE

## 2024-08-09 ASSESSMENT — ACTIVITIES OF DAILY LIVING (ADL)
ADLS_ACUITY_SCORE: 35
ADLS_ACUITY_SCORE: 35

## 2024-08-10 VITALS
TEMPERATURE: 97.4 F | WEIGHT: 146 LBS | HEART RATE: 69 BPM | RESPIRATION RATE: 14 BRPM | OXYGEN SATURATION: 100 % | DIASTOLIC BLOOD PRESSURE: 69 MMHG | SYSTOLIC BLOOD PRESSURE: 111 MMHG | BODY MASS INDEX: 26.7 KG/M2

## 2024-08-10 NOTE — DISCHARGE INSTRUCTIONS
Follow up with your OB this week. Return to the ER or call your OB if you have increased bleeding 1 pad/hr or passgage or large tissue or clots

## 2024-08-10 NOTE — ED PROVIDER NOTES
Emergency Department Note      History of Present Illness     Chief Complaint   Vaginal Bleeding - Pregnant      HPI    was used for the history and physical    Saludpearl Argueta is a 34 year old female with history of  who presents to the ED with  for evaluation of vaginal bleeding. The patient report spot bleeding this morning and it has increased since then. She used at least 2 pads today for the bleeding. She endorse lower abdominal pain that started today. She states she is seen by OB-GYN at [facility]. Adds she had no complication with this pregnancy. Includes her last routine ultrasound was last week, routine which was reportedly normal.. Her last period was on May 5 th. Her blood type is O+ showed by lab test done on 7/3/24.     Independent Historian   None    Review of External Notes   None    Past Medical History     Medical History and Problem List   No past medical history on file.    Medications   HYDROcodone-acetaminophen (NORCO) 5-325 MG tablet  oxyCODONE-acetaminophen (PERCOCET) 5-325 MG per tablet        Surgical History   Past Surgical History:   Procedure Laterality Date    CHOLECYSTECTOMY         Physical Exam     Patient Vitals for the past 24 hrs:   BP Temp Temp src Pulse Resp SpO2 Weight   24 2254 -- -- -- -- -- -- 66.2 kg (146 lb)   24 2148 128/77 97.4  F (36.3  C) Temporal 66 14 100 % --     Physical Exam    Physical Exam   Constitutional:  Patient is oriented to person, place, and time. They appear well-developed and well-nourished.    HENT:   Eyes:    Conjunctivae normal and EOM are normal. Pupils are equal, round, and reactive to light.   Neck:    Normal range of motion.   Abdominal:   Soft. Bowel sounds are normal. Patient exhibits no mass. There is no tenderness. There is no rebound and no guarding. Mild pain to the suprapubic area  Musculoskeletal:  Normal range of motion. Patient exhibits no edema.   Neurological:   Patient  is alert and oriented to person, place, and time. Patient has normal strength. No cranial nerve deficit or sensory deficit. GCS 15  Skin:   Skin is warm and dry. No rash noted. No erythema.   Psychiatric:   Patient has a normal mood and affect. Patient's behavior is normal. Judgment and thought content normal.       Diagnostics     Lab Results   Labs Ordered and Resulted from Time of ED Arrival to Time of ED Departure   BASIC METABOLIC PANEL - Abnormal       Result Value    Sodium 135      Potassium 3.9      Chloride 101      Carbon Dioxide (CO2) 23      Anion Gap 11      Urea Nitrogen 7.4      Creatinine 0.42 (*)     GFR Estimate >90      Calcium 9.0      Glucose 89     HCG QUANTITATIVE PREGNANCY - Abnormal    hCG Quantitative 76,160 (*)    CBC WITH PLATELETS AND DIFFERENTIAL - Abnormal    WBC Count 7.6      RBC Count 4.75      Hemoglobin 12.5      Hematocrit 37.3      MCV 79      MCH 26.3 (*)     MCHC 33.5      RDW 20.8 (*)     Platelet Count 260      % Neutrophils 64      % Lymphocytes 27      % Monocytes 7      % Eosinophils 2      % Basophils 0      % Immature Granulocytes 0      NRBCs per 100 WBC 0      Absolute Neutrophils 4.9      Absolute Lymphocytes 2.0      Absolute Monocytes 0.5      Absolute Eosinophils 0.1      Absolute Basophils 0.0      Absolute Immature Granulocytes 0.0      Absolute NRBCs 0.0         Imaging   US OB < 14 Weeks Single   Final Result   IMPRESSION:    1. Single live intrauterine pregnancy at 12 weeks 4 days estimated gestational age based upon crown-rump length measurement on this study. The estimated date of delivery is 2/17/2025.   2. Small subchorionic hemorrhage.   3. Partial visualization of a few small uterine fibroids.          Independent Interpretation   None    ED Course      Medications Administered   Medications - No data to display    Procedures   Procedures     Discussion of Management   None    ED Course   ED Course as of 08/10/24 0018   Fri Aug 09, 2024   6645 I  obtained history and examined the patient as noted above.     Sat Aug 10, 2024   0005 I rechecked and updated the patient.         Additional Documentation  None    Medical Decision Making / Diagnosis     CMS Diagnoses: None    MIPS       None    JAK Brannon Papo Argueta is a 34 year old female 12 weeks pregnant presenting with vaginal bleeding.  No passage of tissue.  No urinary symptoms no vomiting there is some mild suprapubic cramping.  She is hemodynamically normal.  I did look at her blood test from last month that show she is Rh+.  Quantitative hCG is appropriately elevated.  Her hemoglobin is normal.  An ultrasound was performed which does show an IUP 12 weeks and 5 days.  There is small subchorionic hemorrhage.  Uterine fibroids.  At this point there is not  appear to be anything acutely infected with this pregnancy.  I discussed with her pelvic rest of signs and symptoms to return to the emergency department for such as increased bleeding greater than 1 pad an hour passage of large tissue or clots.  She was follow-up with her OB this year pelvic rest.    Disposition   The patient was discharged.     Diagnosis     ICD-10-CM    1. Vaginal bleeding in pregnancy, first trimester  O20.9            Discharge Medications   New Prescriptions    No medications on file         Scribe Disclosure:  I, Kanchan Joshua, am serving as a scribe at 11:25 PM on 8/9/2024 to document services personally performed by Johanny Toscano MD based on my observations and the provider's statements to me.        Johanny Toscano MD  08/10/24 0018

## 2024-08-10 NOTE — ED TRIAGE NOTES
Pt is 12 weeks pregnant. Pt c/o cramping and bright red vaginal bleeding starting at 1800 tonight. Pt reports she has saturated 2 pads. This is Pts 3rd pregnancy. 2 live births with no complications in the past. Pt reports no complications so far with this pregnancy.      Triage Assessment (Adult)       Row Name 08/09/24 1019          Triage Assessment    Airway WDL WDL        Respiratory WDL    Respiratory WDL WDL        Cardiac WDL    Cardiac WDL WDL        Cognitive/Neuro/Behavioral WDL    Cognitive/Neuro/Behavioral WDL WDL